# Patient Record
Sex: MALE | Race: AMERICAN INDIAN OR ALASKA NATIVE
[De-identification: names, ages, dates, MRNs, and addresses within clinical notes are randomized per-mention and may not be internally consistent; named-entity substitution may affect disease eponyms.]

---

## 2017-12-18 ENCOUNTER — HOSPITAL ENCOUNTER (EMERGENCY)
Dept: HOSPITAL 5 - ED | Age: 57
LOS: 1 days | Discharge: PSYCHIATRIC OR SUBSTANCE USE FACILITY | End: 2017-12-19
Payer: MEDICAID

## 2017-12-18 DIAGNOSIS — F12.10: ICD-10-CM

## 2017-12-18 DIAGNOSIS — E11.9: ICD-10-CM

## 2017-12-18 DIAGNOSIS — I82.401: ICD-10-CM

## 2017-12-18 DIAGNOSIS — F14.10: ICD-10-CM

## 2017-12-18 DIAGNOSIS — Z88.8: ICD-10-CM

## 2017-12-18 DIAGNOSIS — L97.818: Primary | ICD-10-CM

## 2017-12-18 DIAGNOSIS — Z95.818: ICD-10-CM

## 2017-12-18 DIAGNOSIS — R60.0: ICD-10-CM

## 2017-12-18 PROCEDURE — 80307 DRUG TEST PRSMV CHEM ANLYZR: CPT

## 2017-12-18 PROCEDURE — 85025 COMPLETE CBC W/AUTO DIFF WBC: CPT

## 2017-12-18 PROCEDURE — 80048 BASIC METABOLIC PNL TOTAL CA: CPT

## 2017-12-18 PROCEDURE — 85730 THROMBOPLASTIN TIME PARTIAL: CPT

## 2017-12-18 PROCEDURE — 81001 URINALYSIS AUTO W/SCOPE: CPT

## 2017-12-18 PROCEDURE — 80320 DRUG SCREEN QUANTALCOHOLS: CPT

## 2017-12-18 PROCEDURE — 96372 THER/PROPH/DIAG INJ SC/IM: CPT

## 2017-12-18 PROCEDURE — 99284 EMERGENCY DEPT VISIT MOD MDM: CPT

## 2017-12-18 PROCEDURE — 85610 PROTHROMBIN TIME: CPT

## 2017-12-18 PROCEDURE — 73590 X-RAY EXAM OF LOWER LEG: CPT

## 2017-12-18 PROCEDURE — G0480 DRUG TEST DEF 1-7 CLASSES: HCPCS

## 2017-12-18 PROCEDURE — 36415 COLL VENOUS BLD VENIPUNCTURE: CPT

## 2017-12-19 ENCOUNTER — HOSPITAL ENCOUNTER (OUTPATIENT)
Dept: HOSPITAL 5 - VAS | Age: 57
Discharge: HOME | End: 2017-12-19
Attending: EMERGENCY MEDICINE
Payer: MEDICAID

## 2017-12-19 VITALS — SYSTOLIC BLOOD PRESSURE: 137 MMHG | DIASTOLIC BLOOD PRESSURE: 84 MMHG

## 2017-12-19 DIAGNOSIS — F17.200: ICD-10-CM

## 2017-12-19 DIAGNOSIS — I82.412: Primary | ICD-10-CM

## 2017-12-19 DIAGNOSIS — Z79.01: ICD-10-CM

## 2017-12-19 LAB
ANION GAP SERPL CALC-SCNC: 20 MMOL/L
APTT BLD: 28.1 SEC. (ref 24.2–36.6)
BASOPHILS NFR BLD AUTO: 1 % (ref 0–1.8)
BILIRUB UR QL STRIP: (no result)
BLOOD UR QL VISUAL: (no result)
BUN SERPL-MCNC: 15 MG/DL (ref 9–20)
BUN/CREAT SERPL: 13 %
CALCIUM SERPL-MCNC: 9.3 MG/DL (ref 8.4–10.2)
CHLORIDE SERPL-SCNC: 102 MMOL/L (ref 98–107)
CO2 SERPL-SCNC: 21 MMOL/L (ref 22–30)
EOSINOPHIL NFR BLD AUTO: 5.3 % (ref 0–4.3)
GLUCOSE SERPL-MCNC: 92 MG/DL (ref 75–100)
HCT VFR BLD CALC: 46.5 % (ref 35.5–45.6)
HGB BLD-MCNC: 15.9 GM/DL (ref 11.8–15.2)
INR PPP: 1 (ref 0.87–1.13)
KETONES UR STRIP-MCNC: (no result) MG/DL
LEUKOCYTE ESTERASE UR QL STRIP: (no result)
MCH RBC QN AUTO: 32 PG (ref 28–32)
MCHC RBC AUTO-ENTMCNC: 34 % (ref 32–34)
MCV RBC AUTO: 95 FL (ref 84–94)
NITRITE UR QL STRIP: (no result)
PH UR STRIP: 5 [PH] (ref 5–7)
PLATELET # BLD: 199 K/MM3 (ref 140–440)
POTASSIUM SERPL-SCNC: 4.5 MMOL/L (ref 3.6–5)
PROT UR STRIP-MCNC: (no result) MG/DL
RBC # BLD AUTO: 4.92 M/MM3 (ref 3.65–5.03)
RBC #/AREA URNS HPF: 1 /HPF (ref 0–6)
SODIUM SERPL-SCNC: 138 MMOL/L (ref 137–145)
URINE DRUGS OF ABUSE NOTE: (no result)
UROBILINOGEN UR-MCNC: < 2 MG/DL (ref ?–2)
WBC # BLD AUTO: 6.4 K/MM3 (ref 4.5–11)
WBC #/AREA URNS HPF: 2 /HPF (ref 0–6)

## 2017-12-19 NOTE — EMERGENCY DEPARTMENT REPORT
ED General Adult HPI





- General


Chief complaint: Pain General


Stated complaint: RT LEG OPEN WOUND/INFECTION


Time Seen by Provider: 12/19/17 02:40


Source: patient, EMS


Mode of arrival: Stretcher


Limitations: No Limitations





- History of Present Illness


Initial comments: 





 57-year-old male needs medical clearance for mental health treatment at 

[facility] patient does have a history of chronic leg ulcer disease, right 

lower extremity with chronic venous stasis with chronic DVT.  He has stopped 

his Coumadin" a while ago" he presents for medical clearance with a nonhealing 

right leg ulcer and he is worried about increased swelling on the right leg for 

possible DVT.  He denies fever denies chills is nontoxic.  Denies other 

complaints no chest pain no fever no headache no stiff neck now without 

complaints, no chills or fever. wound is stable in appearance per pt not red/

hot.


-: days(s), week(s), month(s), unknown


Location: lower extremity


Radiation: non-radiation


Quality: burning





- Related Data


 Home Medications











 Medication  Instructions  Recorded  Confirmed  Last Taken


 


QUEtiapine [Seroquel] 300 mg PO HS 02/04/14 02/04/14 02/03/14


 


Warfarin [Coumadin] 15 mg PO QDAY 02/04/14 02/04/14 02/04/14


 


risperiDONE [Risperdal] 2 mg PO HS 02/04/14 02/04/14 02/03/14


 


traMADol [Ultram] 50 mg PO Q6HR PRN 02/04/14 02/04/14 02/04/14








 Previous Rx's











 Medication  Instructions  Recorded  Last Taken  Type


 


Oxycodone HCl/Acetaminophen 1 each PO Q6HR PRN #20 tablet 02/04/14 Unknown Rx





[Percocet  mg]    


 


HYDROcodone/APAP 5-325 [[place] 1 - 2 each PO Q6HR PRN #14 tablet 01/27/16 

Unknown Rx





5/325]    











 Allergies











Allergy/AdvReac Type Severity Reaction Status Date / Time


 


ibuprofen [From Motrin] Allergy  Hives Verified 02/04/14 17:25


 


raisens Allergy  Hives Uncoded 02/04/14 17:25














ED Review of Systems


ROS: 


Stated complaint: RT LEG OPEN WOUND/INFECTION


Other details as noted in HPI





Comment: All other systems reviewed and negative


Constitutional: denies: chills, diaphoresis, fever, malaise


Respiratory: denies: cough, orthopnea, shortness of breath, SOB with exertion, 

SOB at rest, stridor, wheezing


Cardiovascular: denies: edema, syncope


Gastrointestinal: denies: abdominal pain


Skin: denies: change in color


Neurological: denies: headache, weakness, numbness, paresthesias





ED Past Medical Hx





- Past Medical History


Previous Medical History?: Yes


Hx Diabetes: Yes (taken off insulin 1 yr)


Hx Deep Vein Thrombosis: Yes (on coumadin)


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





- Surgical History


Past Surgical History?: Yes


Additional Surgical History: pacemaker, greenfilter insertion





- Social History


Smoking Status: Current Every Day Smoker


Substance Use Type: Alcohol, Cocaine, Marijuana, Prescribed, Other





- Medications


Home Medications: 


 Home Medications











 Medication  Instructions  Recorded  Confirmed  Last Taken  Type


 


Oxycodone HCl/Acetaminophen 1 each PO Q6HR PRN #20 tablet 02/04/14  Unknown Rx





[Percocet  mg]     


 


QUEtiapine [Seroquel] 300 mg PO HS 02/04/14 02/04/14 02/03/14 History


 


Warfarin [Coumadin] 15 mg PO QDAY 02/04/14 02/04/14 02/04/14 History


 


risperiDONE [Risperdal] 2 mg PO HS 02/04/14 02/04/14 02/03/14 History


 


traMADol [Ultram] 50 mg PO Q6HR PRN 02/04/14 02/04/14 02/04/14 History


 


HYDROcodone/APAP 5-325 [[place] 1 - 2 each PO Q6HR PRN #14 tablet 01/27/16  

Unknown Rx





5/325]     














ED Physical Exam





- General


Limitations: No Limitations


General appearance: alert, in no apparent distress





- Head


Head exam: Present: atraumatic, normal inspection





- Eye


Eye exam: Present: normal appearance, PERRL, EOMI





- ENT


ENT exam: Present: normal exam





- Neck


Neck exam: Present: normal inspection, full ROM.  Absent: tenderness, 

meningismus





- Respiratory


Respiratory exam: Present: normal lung sounds bilaterally.  Absent: respiratory 

distress, wheezes, rales, rhonchi, stridor, chest wall tenderness, accessory 

muscle use





- Cardiovascular


Cardiovascular Exam: Present: regular rate, normal heart sounds.  Absent: 

systolic murmur, diastolic murmur





- GI/Abdominal


GI/Abdominal exam: Present: soft.  Absent: tenderness





- Extremities Exam


Extremities exam: Present: other (nonhealing ulcer right lower leg distally 

neurovascular intact chronic stasis change with chronic edema right lower 

extremity no cellulitis or abscess no soft tissue gas appreciated distally 

neurovascular intact)





- Back Exam


Back exam: Absent: normal inspection, CVA tenderness (L)





- Neurological Exam


Neurological exam: Present: alert, altered, oriented X3, CN II-XII intact.  

Absent: motor sensory deficit





- Psychiatric


Psychiatric exam: Present: depressed, anxious.  Absent: homicidal ideation, 

suicidal ideation





- Skin


Skin exam: Absent: erythema, petechiae, pallor





ED Course


 Vital Signs











  12/19/17 12/19/17





  01:02 01:30


 


Pulse Rate 75 


 


Respiratory 18 18





Rate  


 


Blood Pressure 131/84 


 


O2 Sat by Pulse 97 97





Oximetry  














ED Medical Decision Making





- Lab Data


Result diagrams: 


 12/19/17 01:36





 12/19/17 01:36





- Radiology Data


Radiology results: report reviewed





- Medical Decision Making





Patient will be medically cleared.  Lovenox given 1/kg.  Ulceration was 

dressed.  Patient does have outpatient follow-up care wound Center at [facility] 

although he's been noncompliant., labs return unremarkable as is xray, pt given 

lovenox to return in am for dvt u/s, stable now for clearance to [facility] w/ 

neg psych clearance labs


Critical care attestation.: 


If time is entered above; I have spent that time in minutes in the direct care 

of this critically ill patient, excluding procedure time.








ED Disposition


Clinical Impression: 


 Chronic ulcer of leg, Pedal edema





Disposition: DC/TX-65 PSY HOSP/PSY UNIT


Is pt being admited?: No


Condition: Stable


Instructions:  Chronic Wound Care (ED), Leg Edema (ED)


Additional Instructions: 


You will need to return in the morning for DVT ON exam.  You were given a shot 

of blood thinner he will need to see her regular doctor as needed or return 

immediately to the ED for neurologic symptoms such as worse swelling or pain 

fever chest pain shortness of breath or other problems


Referrals: 


PRIMARY CARE,MD [Primary Care Provider] - 3-5 Days


Time of Disposition: 03:56 (to [facility])

## 2017-12-19 NOTE — XRAY REPORT
FINAL REPORT



PROCEDURE:  XR TIBIA FIBULA 2V RT



TECHNIQUE:  RIGHT tibia and fibula radiographs, AP and lateral

views. CPT 68052







HISTORY:  wound 



COMPARISON:  No prior studies are available for comparison.



FINDINGS:  

Fracture (s) and/or Dislocation(s): None .



Joint space(s): Normal .



Soft tissues: There is generalized soft tissue swelling. There is

a focal ulceration of the pretibial soft tissues..



Bone mineralization: Normal .



Foreign bodies: None .







IMPRESSION:  

There is generalized soft tissue swelling. There is a focal

ulceration of the pretibial soft tissues..



There is no acute bony abnormality..

## 2017-12-20 ENCOUNTER — HOSPITAL ENCOUNTER (OUTPATIENT)
Dept: HOSPITAL 5 - VAS | Age: 57
Discharge: HOME | End: 2017-12-20
Payer: MEDICAID

## 2017-12-20 DIAGNOSIS — I82.512: Primary | ICD-10-CM

## 2017-12-20 DIAGNOSIS — I82.531: ICD-10-CM

## 2017-12-20 DIAGNOSIS — F17.200: ICD-10-CM

## 2017-12-20 NOTE — VASCULAR LAB REPORT
Left Lower Extremity Venous Duplex Study:



Reason for Exam: Pain and swelling of the left lower extremity.



Comments on the Right:

A limited duplex study was done of the proximal veins of the right 

lower extremity. All veins visualized are freely compressible without 

evidence of internal echogenicity.  Flow is spontaneous and phasic 

throughout. No evidence of acute or chronic thrombus is seen in any of 

the vessels visualized.



Comments on the Left:

Nonocclusive, chronic thrombosis seen throughout the superficial 

femoral vein.  The remaining veins visualized are freely compressible 

without evidence of internal echogenicity.  Spontaneous and phasic flow 

is present proximally.



Impression:



1. Deep venous thrombosis in the left superficial femoral vein.

2.  No evidence of acute deep venous thrombosis in the left lower 

extremity.

## 2017-12-22 NOTE — VASCULAR LAB REPORT
Right Lower Extremity Venous Duplex Study:



Reason for Exam: Pain and swelling of the right lower extremity.



Comments on the Right:

All veins visualized are freely compressible without evidence of 

internal echogenicity.  Flow is spontaneous and phasic throughout. No 

evidence of acute or chronic thrombus is seen in any of the vessels 

visualized.



Comments on the Left:

Nonocclusive chronic deep venous thrombus in the right lower femoral 

vein with extension into the popliteal vein.  The remaining veins 

visualized are freely compressible without evidence of internal 

echogenicity.  Spontaneous and phasic flow is present proximally.



Impression:

Chronic right lower extremity nonocclusive deep venous thrombus of the 

femoral vein extending into the popliteal vein..

## 2018-02-15 ENCOUNTER — HOSPITAL ENCOUNTER (OUTPATIENT)
Dept: HOSPITAL 5 - WOUND | Age: 58
Discharge: HOME | End: 2018-02-15
Attending: NURSE PRACTITIONER
Payer: MEDICAID

## 2018-02-15 DIAGNOSIS — I87.331: Primary | ICD-10-CM

## 2018-02-15 DIAGNOSIS — Z86.718: ICD-10-CM

## 2018-02-15 DIAGNOSIS — L97.811: ICD-10-CM

## 2018-02-15 DIAGNOSIS — F17.210: ICD-10-CM

## 2018-02-15 DIAGNOSIS — Z72.89: ICD-10-CM

## 2018-02-15 DIAGNOSIS — F32.9: ICD-10-CM

## 2018-02-15 DIAGNOSIS — Z95.0: ICD-10-CM

## 2018-02-15 PROCEDURE — 11042 DBRDMT SUBQ TIS 1ST 20SQCM/<: CPT

## 2018-02-15 PROCEDURE — 29580 STRAPPING UNNA BOOT: CPT

## 2018-02-22 ENCOUNTER — HOSPITAL ENCOUNTER (OUTPATIENT)
Dept: HOSPITAL 5 - WOUND | Age: 58
Discharge: HOME | End: 2018-02-22
Attending: NURSE PRACTITIONER
Payer: MEDICAID

## 2018-02-22 DIAGNOSIS — Z72.89: ICD-10-CM

## 2018-02-22 DIAGNOSIS — F17.210: ICD-10-CM

## 2018-02-22 DIAGNOSIS — L97.811: ICD-10-CM

## 2018-02-22 DIAGNOSIS — Z95.0: ICD-10-CM

## 2018-02-22 DIAGNOSIS — L84: ICD-10-CM

## 2018-02-22 DIAGNOSIS — I87.331: Primary | ICD-10-CM

## 2018-02-22 DIAGNOSIS — Z86.718: ICD-10-CM

## 2018-02-22 DIAGNOSIS — F32.9: ICD-10-CM

## 2018-02-22 PROCEDURE — 87075 CULTR BACTERIA EXCEPT BLOOD: CPT

## 2018-02-22 PROCEDURE — 87116 MYCOBACTERIA CULTURE: CPT

## 2018-07-11 ENCOUNTER — HOSPITAL ENCOUNTER (OUTPATIENT)
Dept: HOSPITAL 5 - WOUND | Age: 58
Discharge: HOME | End: 2018-07-11
Attending: SURGERY
Payer: MEDICAID

## 2018-07-11 DIAGNOSIS — Z95.0: ICD-10-CM

## 2018-07-11 DIAGNOSIS — F32.9: ICD-10-CM

## 2018-07-11 DIAGNOSIS — Z86.718: ICD-10-CM

## 2018-07-11 DIAGNOSIS — I89.0: ICD-10-CM

## 2018-07-11 DIAGNOSIS — L97.212: Primary | ICD-10-CM

## 2018-07-11 DIAGNOSIS — F17.210: ICD-10-CM

## 2018-07-11 PROCEDURE — 11045 DBRDMT SUBQ TISS EACH ADDL: CPT

## 2018-07-11 PROCEDURE — 11042 DBRDMT SUBQ TIS 1ST 20SQCM/<: CPT

## 2018-07-18 ENCOUNTER — HOSPITAL ENCOUNTER (OUTPATIENT)
Dept: HOSPITAL 5 - WOUND | Age: 58
Discharge: HOME | End: 2018-07-18
Attending: SURGERY
Payer: MEDICAID

## 2018-07-18 DIAGNOSIS — Z95.0: ICD-10-CM

## 2018-07-18 DIAGNOSIS — L97.212: Primary | ICD-10-CM

## 2018-07-18 DIAGNOSIS — F17.210: ICD-10-CM

## 2018-07-18 DIAGNOSIS — F32.9: ICD-10-CM

## 2018-07-18 DIAGNOSIS — I87.2: ICD-10-CM

## 2018-07-18 DIAGNOSIS — Z86.718: ICD-10-CM

## 2018-07-18 DIAGNOSIS — I89.0: ICD-10-CM

## 2018-07-25 ENCOUNTER — HOSPITAL ENCOUNTER (OUTPATIENT)
Dept: HOSPITAL 5 - WOUND | Age: 58
Discharge: HOME | End: 2018-07-25
Attending: SURGERY
Payer: MEDICAID

## 2018-07-25 DIAGNOSIS — Z95.0: ICD-10-CM

## 2018-07-25 DIAGNOSIS — F17.210: ICD-10-CM

## 2018-07-25 DIAGNOSIS — Z86.718: ICD-10-CM

## 2018-07-25 DIAGNOSIS — I89.0: ICD-10-CM

## 2018-07-25 DIAGNOSIS — L97.212: Primary | ICD-10-CM

## 2018-07-25 DIAGNOSIS — I87.2: ICD-10-CM

## 2018-07-25 DIAGNOSIS — F32.9: ICD-10-CM

## 2018-07-25 PROCEDURE — 29580 STRAPPING UNNA BOOT: CPT

## 2018-08-09 ENCOUNTER — HOSPITAL ENCOUNTER (OUTPATIENT)
Dept: HOSPITAL 5 - WOUND | Age: 58
Discharge: HOME | End: 2018-08-09
Attending: SURGERY
Payer: MEDICAID

## 2018-08-09 DIAGNOSIS — L97.212: Primary | ICD-10-CM

## 2018-08-09 DIAGNOSIS — I89.0: ICD-10-CM

## 2018-08-09 DIAGNOSIS — F17.210: ICD-10-CM

## 2018-08-09 DIAGNOSIS — Z86.718: ICD-10-CM

## 2018-08-09 DIAGNOSIS — F32.9: ICD-10-CM

## 2018-08-09 DIAGNOSIS — Z95.0: ICD-10-CM

## 2018-08-09 DIAGNOSIS — I87.2: ICD-10-CM

## 2018-08-09 PROCEDURE — 29580 STRAPPING UNNA BOOT: CPT

## 2018-08-15 ENCOUNTER — HOSPITAL ENCOUNTER (OUTPATIENT)
Dept: HOSPITAL 5 - WOUND | Age: 58
Discharge: HOME | End: 2018-08-15
Attending: SURGERY
Payer: MEDICAID

## 2018-08-15 DIAGNOSIS — L97.212: Primary | ICD-10-CM

## 2018-08-15 DIAGNOSIS — I89.0: ICD-10-CM

## 2018-08-15 DIAGNOSIS — F17.210: ICD-10-CM

## 2018-08-15 DIAGNOSIS — I87.2: ICD-10-CM

## 2018-08-15 DIAGNOSIS — F32.9: ICD-10-CM

## 2018-08-15 DIAGNOSIS — Z86.718: ICD-10-CM

## 2018-08-15 DIAGNOSIS — Z95.0: ICD-10-CM

## 2018-08-15 PROCEDURE — 29580 STRAPPING UNNA BOOT: CPT

## 2020-08-03 ENCOUNTER — HOSPITAL ENCOUNTER (EMERGENCY)
Dept: HOSPITAL 5 - ED | Age: 60
LOS: 1 days | Discharge: HOME | End: 2020-08-04
Payer: MEDICAID

## 2020-08-03 DIAGNOSIS — E11.65: Primary | ICD-10-CM

## 2020-08-03 DIAGNOSIS — L97.909: ICD-10-CM

## 2020-08-03 DIAGNOSIS — Z79.899: ICD-10-CM

## 2020-08-03 DIAGNOSIS — F17.200: ICD-10-CM

## 2020-08-03 DIAGNOSIS — I82.593: ICD-10-CM

## 2020-08-03 DIAGNOSIS — Z88.8: ICD-10-CM

## 2020-08-03 DIAGNOSIS — Z98.890: ICD-10-CM

## 2020-08-03 DIAGNOSIS — F12.10: ICD-10-CM

## 2020-08-03 LAB
ALBUMIN SERPL-MCNC: 3.7 G/DL (ref 3.9–5)
ALT SERPL-CCNC: 20 UNITS/L (ref 7–56)
APTT BLD: 23.5 SEC. (ref 24.2–36.6)
BASOPHILS # (AUTO): 0.1 K/MM3 (ref 0–0.1)
BASOPHILS NFR BLD AUTO: 1.3 % (ref 0–1.8)
BUN SERPL-MCNC: 18 MG/DL (ref 9–20)
BUN/CREAT SERPL: 16 %
CALCIUM SERPL-MCNC: 8.8 MG/DL (ref 8.4–10.2)
EOSINOPHIL # BLD AUTO: 0.3 K/MM3 (ref 0–0.4)
EOSINOPHIL NFR BLD AUTO: 4.7 % (ref 0–4.3)
HCT VFR BLD CALC: 36.2 % (ref 35.5–45.6)
HEMOLYSIS INDEX: 7
HGB BLD-MCNC: 12.1 GM/DL (ref 11.8–15.2)
INR PPP: 1.03 (ref 0.87–1.13)
LYMPHOCYTES # BLD AUTO: 1.5 K/MM3 (ref 1.2–5.4)
LYMPHOCYTES NFR BLD AUTO: 23.8 % (ref 13.4–35)
MCHC RBC AUTO-ENTMCNC: 33 % (ref 32–34)
MCV RBC AUTO: 94 FL (ref 84–94)
MONOCYTES # (AUTO): 0.7 K/MM3 (ref 0–0.8)
MONOCYTES % (AUTO): 11.4 % (ref 0–7.3)
PLATELET # BLD: 241 K/MM3 (ref 140–440)
RBC # BLD AUTO: 3.84 M/MM3 (ref 3.65–5.03)

## 2020-08-03 PROCEDURE — 85610 PROTHROMBIN TIME: CPT

## 2020-08-03 PROCEDURE — 85025 COMPLETE CBC W/AUTO DIFF WBC: CPT

## 2020-08-03 PROCEDURE — 36415 COLL VENOUS BLD VENIPUNCTURE: CPT

## 2020-08-03 PROCEDURE — 93970 EXTREMITY STUDY: CPT

## 2020-08-03 PROCEDURE — 84484 ASSAY OF TROPONIN QUANT: CPT

## 2020-08-03 PROCEDURE — 71046 X-RAY EXAM CHEST 2 VIEWS: CPT

## 2020-08-03 PROCEDURE — 93005 ELECTROCARDIOGRAM TRACING: CPT

## 2020-08-03 PROCEDURE — 80053 COMPREHEN METABOLIC PANEL: CPT

## 2020-08-03 PROCEDURE — 83880 ASSAY OF NATRIURETIC PEPTIDE: CPT

## 2020-08-03 PROCEDURE — 82140 ASSAY OF AMMONIA: CPT

## 2020-08-03 PROCEDURE — 85730 THROMBOPLASTIN TIME PARTIAL: CPT

## 2020-08-03 NOTE — XRAY REPORT
CHEST 2 VIEWS 



INDICATION: 

Chest Pain. 



COMPARISON: 

None



FINDINGS:

Support devices: Pacing device located left hemithorax electrode tips right ventricle and right atriu
m



Heart: Within normal limits.

Lungs: Bronchovascular markings are prominent..

Pleura: No significant pleural effusion. No pneumothorax.



Additional findings: None.



IMPRESSION:

1. Prominent bronchovascular markings, recommend clinical correlation



Signer Name: Zane Kaufman MD 

Signed: 8/3/2020 7:53 PM

Workstation Name: [id number]

## 2020-08-03 NOTE — XRAY REPORT
HISTORY:BLE wounds



COMPARISON: None.



TECHNIQUE: AP lateral and obliques views were obtained

 

FINDINGS:

Bones: No fracture or dislocation.

Joint spaces: Maintained. 

Soft tissues: No significant abnormality.



Additional findings: Old traumatic changes with reduction internal fixation calcaneus



IMPRESSION:

1. No significant abnormality.



Signer Name: Zane Kaufman MD 

Signed: 8/3/2020 7:52 PM

Workstation Name: [id number]

## 2020-08-04 VITALS — DIASTOLIC BLOOD PRESSURE: 85 MMHG | SYSTOLIC BLOOD PRESSURE: 135 MMHG

## 2020-08-04 NOTE — VASCULAR LAB REPORT
DUPLEX DOPPLER LOWER EXTREMITY VEINS, BILATERAL



INDICATION / CLINICAL INFORMATION:

B/L swelling, non compliance, h/o DVT.



TECHNIQUE:

Duplex doppler imaging was performed through the veins of both lower extremities using venous neisha
howard and other maneuvers.



COMPARISON: 

December 2017



FINDINGS:

RIGHT COMMON FEMORAL VEIN: Chronic thrombus. No acute thrombus.

RIGHT FEMORAL VEIN: Chronic thrombus. No acute thrombus.

RIGHT POPLITEAL VEIN: Chronic thrombus. No acute thrombus.

RIGHT CALF VEINS: Not well visualized.



LEFT COMMON FEMORAL VEIN: Chronic thrombus. No acute thrombus.

LEFT FEMORAL VEIN: Chronic thrombus. No acute thrombus.

LEFT POPLITEAL VEIN: Chronic thrombus. No acute thrombus.

LEFT CALF VEINS: Negative.



ADDITIONAL FINDINGS: None.



IMPRESSION:

1. Extensive bilateral chronic deep venous thrombosis, as above. Findings are similar to the remote p
rior exam.



Signer Name: Ruslan Snyder MD 

Signed: 8/4/2020 11:02 AM

Workstation Name: [id number]

## 2020-08-04 NOTE — EMERGENCY DEPARTMENT REPORT
ED General Adult HPI





- General


Chief complaint: Chest Pain


Stated complaint: CP/SWELLING IN LEGS/


Time Seen by Provider: 08/03/20 19:06


Source: patient


Mode of arrival: Ambulatory


Limitations: No Limitations





- History of Present Illness


Initial comments: 


This is a 59-year-old man with an apparent history of schizophrenia and medical 

noncompliance.  He states he has not seen a doctor for quite some time.  He 

cannot be more specific than that.  He is noncompliant with his Coumadin being 

prescribed after DVT/PE.  He is not complaining of chest pain to me at all.  He 

does not complain of shortness of breath.  He complains of both his legs being 

swollen.  He has old dressings on his wounds.  I suspect this is from a another 

ER visit sometime ago.  He is not a good source of information however.  He does

not complain of acute dyspnea or cough.





Patient states that he is not diabetic.  However the populated note states he 

was "taken off insulin 1 year ago".


-: month(s), year(s)


Severity scale (0 -10): 5


Quality: aching


Consistency: intermittent


Improves with: other (Previously been to the wound clinic but now noncompliant)


Associated Symptoms: denies other symptoms (Really not offering any active 

complaint other than his legs being swollen)





- Related Data


                                Home Medications











 Medication  Instructions  Recorded  Confirmed  Last Taken


 


QUEtiapine [Seroquel] 300 mg PO HS 02/04/14 08/04/20 02/03/14


 


Warfarin [Coumadin] 15 mg PO QDAY 02/04/14 08/04/20 02/04/14


 


risperiDONE [Risperdal] 2 mg PO HS 02/04/14 08/04/20 02/03/14


 


traMADoL [Ultram] 50 mg PO Q6HR PRN 02/04/14 08/04/20 02/04/14








                                  Previous Rx's











 Medication  Instructions  Recorded  Last Taken  Type


 


Oxycodone HCl/Acetaminophen 1 each PO Q6HR PRN #20 tablet 02/04/14 Unknown Rx





[Percocet  mg]    


 


HYDROcodone/APAP 5-325 [[place] 1 - 2 each PO Q6HR PRN #14 tablet 01/27/16 Unknown

 Rx





5/325]    


 


metFORMIN [Glucophage] 500 mg PO BID #60 tablet 08/04/20 Unknown Rx











                                    Allergies











Allergy/AdvReac Type Severity Reaction Status Date / Time


 


ibuprofen [From Motrin] Allergy  Hives Verified 02/04/14 17:25


 


RAISIN Allergy  Swelling Uncoded 07/17/18 08:43


 


RAISINS AdvReac  Hives Uncoded 12/19/17 12:58














ED Review of Systems


ROS: 


Stated complaint: CP/SWELLING IN LEGS/


Other details as noted in HPI





Constitutional: denies: chills, fever


Eyes: denies: eye pain, vision change


ENT: denies: ear pain, throat pain


Respiratory: denies: cough, shortness of breath


Cardiovascular: denies: chest pain, palpitations


Endocrine: no symptoms reported


Gastrointestinal: denies: abdominal pain, nausea, diarrhea


Genitourinary: denies: urgency, dysuria


Musculoskeletal: as per HPI, other.  denies: back pain, joint swelling, 

arthralgia


Skin: denies: rash, lesions


Neurological: denies: headache, weakness


Psychiatric: denies: anxiety, depression


Hematological/Lymphatic: denies: easy bleeding, easy bruising





ED Past Medical Hx





- Past Medical History


Previous Medical History?: Yes


Hx Diabetes: Yes (taken off insulin 1 yr)


Hx Deep Vein Thrombosis: Yes (on coumadin)


Additional medical history: Pacemaker





- Surgical History


Past Surgical History?: Yes


Additional Surgical History: Cardiac pacemaker.  [place] filter





- Social History


Smoking Status: Current Every Day Smoker


Substance Use Type: Marijuana





- Medications


Home Medications: 


                                Home Medications











 Medication  Instructions  Recorded  Confirmed  Last Taken  Type


 


Oxycodone HCl/Acetaminophen 1 each PO Q6HR PRN #20 tablet 02/04/14 08/04/20 

Unknown Rx





[Percocet  mg]     


 


QUEtiapine [Seroquel] 300 mg PO HS 02/04/14 08/04/20 02/03/14 History


 


Warfarin [Coumadin] 15 mg PO QDAY 02/04/14 08/04/20 02/04/14 History


 


risperiDONE [Risperdal] 2 mg PO HS 02/04/14 08/04/20 02/03/14 History


 


traMADoL [Ultram] 50 mg PO Q6HR PRN 02/04/14 08/04/20 02/04/14 History


 


HYDROcodone/APAP 5-325 [[place] 1 - 2 each PO Q6HR PRN #14 tablet 01/27/16 08/04/20 Unknown Rx





5/325]     


 


metFORMIN [Glucophage] 500 mg PO BID #60 tablet 08/04/20  Unknown Rx














ED Physical Exam





- General


Limitations: Physical Limitation


General appearance: alert, in no apparent distress, obese





- Head


Head exam: Present: atraumatic, normocephalic





- Eye


Eye exam: Present: normal appearance.  Absent: scleral icterus





- ENT


ENT exam: Present: mucous membranes moist





- Neck


Neck exam: Present: normal inspection





- Respiratory


Respiratory exam: Present: normal lung sounds bilaterally.  Absent: respiratory 

distress





- Cardiovascular


Cardiovascular Exam: Present: regular rate, normal rhythm.  Absent: systolic 

murmur, diastolic murmur, rubs, gallop





- GI/Abdominal


GI/Abdominal exam: Present: soft, normal bowel sounds.  Absent: distended, 

tenderness, guarding, rebound





- Rectal


Rectal exam: Present: deferred





- Extremities Exam


Extremities exam: Present: other (Bilateral pitting leg edema and leg ulcers 

below the knee).  Absent: normal inspection (Patient essentially has 

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  He does have ulcers with minimal bleeding on 

removal of dressings.  There is induration.  However there are no overt signs of

 infection.)





- Back Exam


Back exam: Present: normal inspection





- Neurological Exam


Neurological exam: Present: alert, oriented X3, CN II-XII intact.  Absent: motor

 sensory deficit





- Psychiatric


Psychiatric exam: Present: normal affect, normal mood





- Skin


Skin exam: Present: warm, dry, normal color.  Absent: rash





ED Course


                                   Vital Signs











  08/03/20 08/04/20 08/04/20





  19:11 03:11 04:20


 


Temperature 98 F 97.8 F 98.2 F


 


Pulse Rate 84 70 69


 


Respiratory 24 69 H 18





Rate   


 


Blood Pressure 149/88 156/88 


 


Blood Pressure   136/82





[Left]   


 


O2 Sat by Pulse 97  99





Oximetry   














  08/04/20 08/04/20





  04:23 05:46


 


Temperature  


 


Pulse Rate  67


 


Respiratory 18 18





Rate  


 


Blood Pressure  


 


Blood Pressure  117/68





[Left]  


 


O2 Sat by Pulse  99





Oximetry  














- Reevaluation(s)


Reevaluation #1: 


Patient remains in no distress.  He has no respiratory symptoms.  He had no 

complaint of chest pain.  He has chronic DVT with an IVC filter.  He is 

previously noncompliant with everything.  I do not see an indication for acute 

anticoagulation at this time.  He is referred to the [place] medical clinic.  

He will be given metformin.  He continues to deny any history of diabetes.  

There is no current indication for hospitalization.


08/04/20 11:25








ED Medical Decision Making





- Lab Data


Result diagrams: 


                                 08/03/20 19:22





                                 08/03/20 19:22








                         Laboratory Results - last 24 hr











  08/03/20 08/03/20 08/03/20





  19:22 19:22 19:22


 


WBC  6.3  


 


RBC  3.84  


 


Hgb  12.1  


 


Hct  36.2  


 


MCV  94  


 


MCH  31  


 


MCHC  33  


 


RDW  15.2  


 


Plt Count  241  


 


Lymph % (Auto)  23.8  


 


Mono % (Auto)  11.4 H  


 


Eos % (Auto)  4.7 H  


 


Baso % (Auto)  1.3  


 


Lymph #  1.5  


 


Mono #  0.7  


 


Eos #  0.3  


 


Baso #  0.1  


 


Seg Neutrophils %  58.8  


 


Seg Neutrophils #  3.7  


 


PT   13.6 


 


INR   1.03 


 


APTT   23.5 L 


 


Sodium    140


 


Potassium    4.1


 


Chloride    106.4


 


Carbon Dioxide    20 L


 


Anion Gap    18


 


BUN    18


 


Creatinine    1.1


 


Estimated GFR    > 60


 


BUN/Creatinine Ratio    16


 


Glucose    227 H


 


Lactic Acid   


 


Calcium    8.8


 


Total Bilirubin    0.30


 


AST    17


 


ALT    20


 


Alkaline Phosphatase    86


 


Troponin T    < 0.010


 


NT-Pro-B Natriuret Pep    101.3


 


Total Protein    8.5 H


 


Albumin    3.7 L


 


Albumin/Globulin Ratio    0.8














  08/03/20 08/03/20 08/03/20





  19:22 22:12 22:12


 


WBC   


 


RBC   


 


Hgb   


 


Hct   


 


MCV   


 


MCH   


 


MCHC   


 


RDW   


 


Plt Count   


 


Lymph % (Auto)   


 


Mono % (Auto)   


 


Eos % (Auto)   


 


Baso % (Auto)   


 


Lymph #   


 


Mono #   


 


Eos #   


 


Baso #   


 


Seg Neutrophils %   


 


Seg Neutrophils #   


 


PT   


 


INR   


 


APTT   


 


Sodium   


 


Potassium   


 


Chloride   


 


Carbon Dioxide   


 


Anion Gap   


 


BUN   


 


Creatinine   


 


Estimated GFR   


 


BUN/Creatinine Ratio   


 


Glucose   


 


Lactic Acid  2.00   1.70


 


Calcium   


 


Total Bilirubin   


 


AST   


 


ALT   


 


Alkaline Phosphatase   


 


Troponin T   < 0.010 


 


NT-Pro-B Natriuret Pep   


 


Total Protein   


 


Albumin   


 


Albumin/Globulin Ratio   














- EKG Data


-: EKG Interpreted by Me


EKG shows normal: sinus rhythm, axis, intervals, QRS complexes, ST-T waves


Rate: normal





- EKG Data


Interpretation: no acute changes





- Radiology Data


Radiology results: report reviewed, image reviewed


IMPRESSION: 


1. Prominent bronchovascular markings, recommend clinical correlation 





IMPRESSION: 


1. Extensive bilateral chronic deep venous thrombosis, as above. Findings are 

similar to the remote


 prior exam. 


Critical care attestation.: 


If time is entered above; I have spent that time in minutes in the direct care 

of this critically ill patient, excluding procedure time.








ED Disposition


Clinical Impression: 


Hyperglycemia due to type 2 diabetes mellitus


Qualifiers:


 Diabetes mellitus long term insulin use: without long term use Qualified Cod

e(s): E11.65 - Type 2 diabetes mellitus with hyperglycemia





Leg ulcer


Qualifiers:


 Laterality: unspecified laterality Non-pressure ulcer stage: unspecified non-

pressure ulcer stage Qualified Code(s): L97.909 - Non-pressure chronic ulcer of 

unspecified part of unspecified lower leg with unspecified severity





Chronic deep vein thrombosis (DVT)


Qualifiers:


 DVT location: lower extremity Affected thrombotic vein of extremity: 

unspecified lower extremity distal vein Laterality: bilateral Qualified Code(s):

 I82.5Z3 - Chronic embolism and thrombosis of unspecified deep veins of distal 

lower extremity, bilateral





Disposition: DC-01 TO HOME OR SELFCARE


Is pt being admited?: No


Does the pt Need Aspirin: No


Condition: Stable


Instructions:  Diabetes Mellitus Type 2 in Adults (ED)


Additional Instructions: 


Further care at the [facility] is strongly advised.  Elevate your 

legs.  Dressing changes at least every other day.  Wound care clinic can be 

arranged through your primary care provider.  Rx Metformin.


Prescriptions: 


metFORMIN [Glucophage] 500 mg PO BID #60 tablet


Referrals: 


[facility] [Provider Group] - 3-5 Days


CLARENCE KOCH MD [Staff Physician] - 3-5 Days


PRIMARY CARE,MD [Primary Care Provider] - 3-5 Days


[facility] [Outside] - 3-5 Days


Time of Disposition: 11:28

## 2021-07-14 ENCOUNTER — HOSPITAL ENCOUNTER (OUTPATIENT)
Dept: HOSPITAL 5 - CT | Age: 61
Discharge: HOME | End: 2021-07-14
Attending: RADIOLOGY
Payer: MEDICAID

## 2021-07-14 DIAGNOSIS — I82.221: ICD-10-CM

## 2021-07-14 DIAGNOSIS — I87.8: ICD-10-CM

## 2021-07-14 DIAGNOSIS — I82.423: Primary | ICD-10-CM

## 2021-07-14 LAB — BUN SERPL-MCNC: 27 MG/DL (ref 9–20)

## 2021-07-14 PROCEDURE — 82565 ASSAY OF CREATININE: CPT

## 2021-07-14 PROCEDURE — 36415 COLL VENOUS BLD VENIPUNCTURE: CPT

## 2021-07-14 PROCEDURE — 74177 CT ABD & PELVIS W/CONTRAST: CPT

## 2021-07-14 PROCEDURE — 84520 ASSAY OF UREA NITROGEN: CPT

## 2021-07-14 NOTE — CAT SCAN REPORT
CT ABDOMEN AND PELVIS WITH CONTRAST



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



COMPARISON: None



TECHNIQUE: Routine abdominal and pelvic CT exam performed  following intravenous contrast administrat
ion.. All CT scans at this location are performed using CT dose reduction for ALARA by means of autom
ated exposure control.



FINDINGS:



CT ABDOMEN:

Lung Bases: No significant abnormality.

Liver: No significant abnormality.

Biliary: No significant abnormality.

Spleen: No significant abnormality.  Unenlarged.

Pancreas: No significant abnormality.

Adrenals: No significant abnormality.

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 There is c
ompensatory hypertrophy of the right kidney.

Lymphatics: No lymphadenopathy.

Vasculature: The IVC is not visualized below the level of the renal veins. The renal veins do reconst
itute the IVC and the more superior IVC extending into the right atrium appears normal. There are ext
ensive venous collaterals in the abdominal wall and retroperitoneum. The bilateral iliac veins are al
so diminutive in size and likely ultimately chronically occluded. 

Bowel/Peritoneum: No significant abnormality. No free air. No free fluid. Normal appendix.



CT PELVIC:

: No significant abnormality.

Lymphatics: No lymphadenopathy.



Osseous Structures: No aggressive appearing osseous lesions.

Additional Findings: None



IMPRESSION:

1. 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
bdominal wall collaterals.



Signer Name: Chao Saldana MD 

Signed: 7/14/2021 12:02 PM

Workstation Name: [id number]
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
